# Patient Record
Sex: MALE | Race: OTHER | NOT HISPANIC OR LATINO | URBAN - METROPOLITAN AREA
[De-identification: names, ages, dates, MRNs, and addresses within clinical notes are randomized per-mention and may not be internally consistent; named-entity substitution may affect disease eponyms.]

---

## 2020-04-02 ENCOUNTER — EMERGENCY (EMERGENCY)
Facility: HOSPITAL | Age: 30
LOS: 1 days | Discharge: HOME | End: 2020-04-02
Admitting: EMERGENCY MEDICINE
Payer: MEDICAID

## 2020-04-02 VITALS — OXYGEN SATURATION: 98 % | RESPIRATION RATE: 18 BRPM | HEART RATE: 99 BPM

## 2020-04-02 VITALS
RESPIRATION RATE: 20 BRPM | TEMPERATURE: 98 F | DIASTOLIC BLOOD PRESSURE: 68 MMHG | OXYGEN SATURATION: 100 % | HEART RATE: 111 BPM | SYSTOLIC BLOOD PRESSURE: 117 MMHG

## 2020-04-02 PROCEDURE — 99283 EMERGENCY DEPT VISIT LOW MDM: CPT

## 2020-04-02 RX ORDER — ACETAMINOPHEN 500 MG
650 TABLET ORAL ONCE
Refills: 0 | Status: COMPLETED | OUTPATIENT
Start: 2020-04-02 | End: 2020-04-02

## 2020-04-02 RX ADMIN — Medication 650 MILLIGRAM(S): at 19:58

## 2020-04-02 NOTE — ED PROVIDER NOTE - PLAN OF CARE
Likely URI, limited concern for bacterial infection at this time.   Vital signs reviewed, HR mildly elevated with fever. Will provide PO tylenol, PO fluids and re-assess. Walking O2 sat trial. Imaging would not be beneficial at this time. No other acute intervention indicated.

## 2020-04-02 NOTE — ED PROVIDER NOTE - NSFOLLOWUPINSTRUCTIONS_ED_ALL_ED_FT
Your symptoms may be due to the novel coronavirus (COVID19).     For any questions regarding COVID19 or quarantine, changes in your clinical status or any concerns, please call: [facility] Emergency [facility] at: [phone]    Please return to the ER if you develop worsening or concerning symptoms, shortness of breath, high fevers not improved with antifever medications, difficulty eating or drinking, chest pain, or anything else of concern to you.    You may take 500-1000 mg acetaminophen every 6 hours, as needed for fever / pain  You may take 600 mg ibuprofen every 8 hours, with food, as needed for fever / pain    What is a coronavirus?  Coronaviruses are a large family of viruses that cause illnesses ranging from the common cold to more severe diseases such as Middle East Respiratory Syndrome (MERS) and Severe Acute Respiratory Syndrome (SARS).    What is Novel Coronavirus (COVID-19)?  The Centers for Disease Control and Prevention (CDC) is closely monitoring the outbreak caused by COVID-19. For the latest information about COVID-19, visit the CDC website at CDC.gov/Coronavirus    How are coronaviruses spread?  Coronaviruses can be transmitted from person-toperson, usually after close contact with an infected  person (for example, in a household, workplace, or healthcare setting), via droplets that become airborne after a cough or sneeze. These droplets can then infect a nearby person. Transmission can also occur by touching recently contaminated surfaces.    Is there a treatment for a COVID-19?  There is no specific treatment for disease caused by COVID-19. However, many of the symptoms can be treated based on the patient’s clinical condition. Supportive care for infected persons can be highly effective.    What are the symptoms of coronavirus infection?  It depends on the virus, but common signs include fever and/or respiratory symptoms such as cough and shortness of breath. In more severe cases, infection can cause pneumonia, severe acute respiratory syndrome, kidney failure and even death. Fortunately, most cases of COVID-19 have an illness no different than the influenza (flu), with a majority of these patients having mild symptoms and overall mortality which appears to be not much different than the flu.    What can I do to protect myself?  The best precautionary measures:  – washing your hands  – covering your cough  – disinfecting surfaces  – it is also advisable to avoid close contact with anyone showing symptoms of respiratory illness such as coughing and sneezing  – those with symptoms should wear a surgical mask when around others    What can I do to protect those around me?  If you have been identified as someone who may be infected with COVID-19, we recommend you follow the self-isolation procedures outlined on the following page to protect those around you and to limit the spread of this virus.    We recommend the below precautionary steps from now until 14 days from when you returned from your travel or date of your last known possible contact:    — Do not go to work, school or public areas. Avoid using public transportation, ridesharing or taxis.  — As much as possible, separate yourself from other people in your home. If you can, you should stay in a room and away from other people. Also, you should use a separate bathroom if available.  — Wear the supplied mask whenever you are around other people.  — If you have a non-urgent medical appointment, please reschedule for a later date. If the appointment is urgent, please call the health care provider and tell them that you are on self-isolation for possible COVID-19. This will help the health care provider’s office take steps to keep other people from getting infected or exposed. If you can reschedule routine appointments, do so.  — Wash your hands often with soap and water for at least 15 to 20 seconds or clean your hands with an alcohol-based hand  that contains 60 to 95% alcohol, covering all surfaces of your hands and rubbing them together until they feel dry. Soap and water should be used preferentially if hands are visibly dirty.  — Cover your mouth and nose with a tissue when you cough or sneeze. Throw used tissues in a lined trash can. Immediately wash your hands.  — Avoid touching your eyes, nose, and mouth with your hands.  — Avoid sharing personal household items. You should not share dishes, drinking glasses, cups, eating utensils, towels, or bedding with other people or pets in your home. After using these items, they should be washed thoroughly with soap and water.  — Clean and disinfect all “high-touch”surfaces every day. High touch surfaces include counters, tabletops, doorknobs, light switches, remote controls, bathroom fixtures, toilets, phones, keyboards, tablets, and bedside tables. Also, clean any surfaces that may have blood, stool, or body fluids on them.

## 2020-04-02 NOTE — ED PROVIDER NOTE - RESPIRATORY, MLM
Breath sounds clear and equal bilaterally. Non labored. No accessory muscle use. No intercostal muscle retractions. Speaking full sentences.

## 2020-04-02 NOTE — ED PROVIDER NOTE - PATIENT PORTAL LINK FT
You can access the FollowMyHealth Patient Portal offered by [facility] by registering at the following website: [email or web address]. By joining [employer]’s FollowMyHealth portal, you will also be able to view your health information using other applications (apps) compatible with our system.

## 2020-04-02 NOTE — ED PROVIDER NOTE - OBJECTIVE STATEMENT
This pt is a 28 y/o M w/ no significant PMHx presenting to ED today c/o fever, cough x3-4 days. Tmax 100.5F, last taken tylenol at 3PM today. Reports mild sob 2/2 cough, none at rest. Also c/o chills. Wife at home with similar symptoms otherwise no other sick contacts. Non smoker. Denies cp, dyspnea on exertion, abd pain, N/V/D, myalgias, dizziness, lightheadedness, occupational exposures, recent travel, hx asthma/copd. No other acute complaints or concerns this visit. This pt is a 28 y/o M w/ no significant PMHx presenting to ED today c/o fever, cough x3-4 days. Tmax 100.5F, last taken tylenol at 3PM today. Reports mild sob 2/2 cough, none at rest, none at present. Also c/o chills. Intermittent dry cough. Wife at home with similar symptoms otherwise no other sick contacts. Non smoker. Denies cp, dyspnea on exertion, abd pain, N/V/D, myalgias, dizziness, lightheadedness, occupational exposures, recent travel, hx asthma/copd. No other acute complaints or concerns this visit.

## 2020-04-02 NOTE — ED PROVIDER NOTE - CARE PLAN
Principal Discharge DX:	URI (upper respiratory infection)  Assessment and plan of treatment:	Likely URI, limited concern for bacterial infection at this time.   Vital signs reviewed, HR mildly elevated with fever. Will provide PO tylenol, PO fluids and re-assess. Walking O2 sat trial. Imaging would not be beneficial at this time. No other acute intervention indicated.

## 2020-04-02 NOTE — ED PROVIDER NOTE - CLINICAL SUMMARY MEDICAL DECISION MAKING FREE TEXT BOX
28 y/o health M w/ no PMHx p/f cough, sob, fever, chills. No symptoms at present at rest. Pt otherwise well appearing with benign examination without concerning signs or features. Imaging not indicated at this time as likely URI, cannot rule out covid. Tylenol provided with resolution of fever and tachycardia. Walking O2 sat trial without hypoxia noted or change in breathing effort. Will discharge home with strict return instructions.

## 2020-04-03 ENCOUNTER — EMERGENCY (EMERGENCY)
Facility: HOSPITAL | Age: 30
LOS: 1 days | Discharge: HOME | End: 2020-04-03
Admitting: EMERGENCY MEDICINE
Payer: MEDICAID

## 2020-04-03 VITALS
SYSTOLIC BLOOD PRESSURE: 110 MMHG | OXYGEN SATURATION: 97 % | TEMPERATURE: 100 F | RESPIRATION RATE: 22 BRPM | HEART RATE: 112 BPM | DIASTOLIC BLOOD PRESSURE: 68 MMHG

## 2020-04-03 VITALS
SYSTOLIC BLOOD PRESSURE: 106 MMHG | RESPIRATION RATE: 20 BRPM | OXYGEN SATURATION: 100 % | TEMPERATURE: 100 F | HEART RATE: 100 BPM | DIASTOLIC BLOOD PRESSURE: 68 MMHG

## 2020-04-03 PROCEDURE — 71045 X-RAY EXAM CHEST 1 VIEW: CPT | Mod: 26

## 2020-04-03 PROCEDURE — 99283 EMERGENCY DEPT VISIT LOW MDM: CPT

## 2020-04-03 RX ORDER — ACETAMINOPHEN 500 MG
975 TABLET ORAL ONCE
Refills: 0 | Status: COMPLETED | OUTPATIENT
Start: 2020-04-03 | End: 2020-04-03

## 2020-04-03 RX ADMIN — Medication 975 MILLIGRAM(S): at 10:53

## 2020-04-03 NOTE — ED PROVIDER NOTE - PATIENT PORTAL LINK FT
You can access the FollowMyHealth Patient Portal offered by [facility] by registering at the following website: [email or web address]. By joining [employer]’s FollowMyHealth portal, you will also be able to view your health information using other applications (apps) compatible with our system.

## 2020-04-03 NOTE — ED PROVIDER NOTE - PHYSICAL EXAMINATION
no nuchal rigidity. Tachypnea, febrile, able to ambulate over 20 feet, O2 saturation with ambulation 97% with . Lips and finger tips pink, wwp, cap refill <2 secs. Arti Shanks NP

## 2020-04-03 NOTE — ED PROVIDER NOTE - CLINICAL SUMMARY MEDICAL DECISION MAKING FREE TEXT BOX
1052: 30 y/o M with worsening cough and SOB on exertion.  Denies CP, walking saturation 97%.  Tachycardic to 110's, also febrile. Will give tylenol, cxr, reassess. Arti Shanks NP 1052: 30 y/o M with worsening cough and SOB on exertion.  Denies CP, walking saturation 97%.  Tachycardic to 110's, also febrile. Will give tylenol, cxr, reassess. Arti Shanks NP    28y/o M with no significant medical history c/o increase difficulty when breathing deeply, fever, chills, body aches, weakness and stomach pain .  Pt reports cough for the past week that has increased today. Pt has not been in contact with anyone with COVID-19. Hydrate appropriately, Tylenol PRN, Self quarantine. 1052: 28 y/o M with worsening cough and SOB on exertion.  Denies CP, walking saturation 97%.  Tachycardic to 110's, also febrile. Will give tylenol, cxr, reassess. Arti Shanks NP    28y/o M with no significant medical history c/o increase difficulty when breathing deeply, fever, chills, body aches, weakness and stomach pain .  Pt reports cough for the past week that has increased today. Pt has not been in contact with anyone with COVID-19. PE or SBI less likely, more likely viral syndrome.  Hydrate appropriately, Tylenol PRN, Self quarantine. 1052: 30 y/o M with worsening cough and SOB on exertion.  Denies CP, walking saturation 97%.  Tachycardic to 110's, also febrile. Will give tylenol, cxr, reassess. Arti Shanks NP    28y/o M with no significant medical history c/o increase difficulty when breathing deeply, fever, chills, body aches, weakness and stomach pain .  Pt reports cough for the past week that has increased today. Pt has not been in contact with anyone with COVID-19. PE or SBI less likely, pt is well appearing, denies CP, HA but no nuchal rigidity, neck supple, more likely viral syndrome.  Hydrate appropriately, Tylenol PRN, Self quarantine.

## 2020-04-03 NOTE — ED PROVIDER NOTE - OBJECTIVE STATEMENT
30 y/o M w/ no significant PMHx presenting to ED today c/o fever, cough 4-5 days. Tmax 102.8F, last taken tylenol at 6aM today. Reports worsening SOB, cough.  Also c/o chills. Intermittent dry cough. Wife at home with similar symptoms otherwise no other sick contacts. Non smoker. Denies cp, abd pain, N/V/D, myalgias, dizziness, lightheadedness, occupational exposures, recent travel, hx asthma/copd. No other acute complaints or concerns this visit. Seen yesterday and d/c'ed home with return precautions, reports worsening sob today which brought him back. 28 y/o M w/ no significant PMHx presenting to ED today c/o fever, cough 4-5 days. Tmax 102.8F, last taken tylenol at 6aM today. Reports worsening SOB, cough.  Also c/o chills. Intermittent dry cough. Wife at home with similar symptoms otherwise no other sick contacts. Non smoker. Denies cp, abd pain, N/V/D, myalgias, dizziness, lightheadedness, occupational exposures, recent travel, hx asthma/copd. No other acute complaints or concerns this visit. Seen yesterday and d/c'ed home with return precautions, reports worsening sob today which brought him back. He is on day 3 of azithromycin prescribed by his PCP.

## 2020-04-03 NOTE — ED ADULT TRIAGE NOTE - CHIEF COMPLAINT QUOTE
pt recently discharged yesterday for flu like symptoms. pt noted with fevers, cough and JOHNSON. pt feels worse today compared to yesterday.

## 2020-04-03 NOTE — ED PROVIDER NOTE - NSFOLLOWUPINSTRUCTIONS_ED_ALL_ED_FT
Take 2 tablets of Tylenol/Acetaminophen every 6-8 hours as needed for pain/comfort  Self quarantine  Return for worsening symptoms.     Fever    A fever is an increase in the body's temperature above 100.4°F (38°C) or higher. In adults and children older than three months, a brief mild or moderate fever generally has no long-term effect, and it usually does not require treatment. Many times, fevers are the result of viral infections, which are self-resolving.  However, certain symptoms or diagnostic tests may suggest a bacterial infection that may respond to antibiotics. Take medications as directed by your health care provider.    SEEK IMMEDIATE MEDICAL CARE IF YOU OR YOUR CHILD HAVE ANY OF THE FOLLOWING SYMPTOMS : shortness of breath, seizure, rash/stiff neck/headache, severe abdominal pain, persistent vomiting, any signs of dehydration.     -- Please avoid contact with others while you are symptomatic and ideally two weeks.  -- This is likely a virus.  There is no direct treatment for a virus, and antibiotics are not effective.     -- Rest, increase your fluid intake and avoid dehydration.  -- It is very important to be diligent about hand washing & hygiene to avoid spreading the illness to others.  -- If you are having any worsening shortness of breath, please see your doctor or return to the Emergency Department.  -- Please continue taking your home medications as directed.     Why didn’t I get tested for novel coronavirus (COVID-19)?    The number of available tests is very limited so strict rules exist for who is allowed to be tested. [facility] has been authorized to perform testing and is currently working hard to be able to start providing the test. Such testing is currently reserved for patients who have had contact with someone infected with the virus, or those who are very sick a plus those who have traveled to areas identified by the Centers for Disease Control and Prevention (CD) and will require hospitalization.     What should I do now?    If you are well enough to be discharged home and are not in a high risk group to have contracted the COVID-19, you should care for yourself at home exactly like you would if you have Influenza “flu”. Follow all the standard guidelines about washing your hands, covering your cough, etc. You should return to the Emergency Department if you develop worse symptoms, trouble breathing, chest pain, and/or a fever that doesn’t improve with over the counter acetaminophen or ibuprofen. Take 2 tablets of Tylenol/Acetaminophen every 6-8 hours as needed for pain/comfort  Drink fluids with water, salt and sugar like diluted apple juice, gatorade, poweraide or other rehydration drinks.   Self quarantine until fever and symptoms free for 3 days without medicine.   Return for worsening symptoms.     Fever    A fever is an increase in the body's temperature above 100.4°F (38°C) or higher. In adults and children older than three months, a brief mild or moderate fever generally has no long-term effect, and it usually does not require treatment. Many times, fevers are the result of viral infections, which are self-resolving.  However, certain symptoms or diagnostic tests may suggest a bacterial infection that may respond to antibiotics. Take medications as directed by your health care provider.    SEEK IMMEDIATE MEDICAL CARE IF YOU OR YOUR CHILD HAVE ANY OF THE FOLLOWING SYMPTOMS : shortness of breath, seizure, rash/stiff neck/headache, severe abdominal pain, persistent vomiting, any signs of dehydration.     -- Please avoid contact with others while you are symptomatic and ideally two weeks.  -- This is likely a virus.  There is no direct treatment for a virus, and antibiotics are not effective.     -- Rest, increase your fluid intake and avoid dehydration.  -- It is very important to be diligent about hand washing & hygiene to avoid spreading the illness to others.  -- If you are having any worsening shortness of breath, please see your doctor or return to the Emergency Department.  -- Please continue taking your home medications as directed.     Why didn’t I get tested for novel coronavirus (COVID-19)?    The number of available tests is very limited so strict rules exist for who is allowed to be tested. [facility] has been authorized to perform testing and is currently working hard to be able to start providing the test. Such testing is currently reserved for patients who have had contact with someone infected with the virus, or those who are very sick a plus those who have traveled to areas identified by the Centers for Disease Control and Prevention (CD) and will require hospitalization.     What should I do now?    If you are well enough to be discharged home and are not in a high risk group to have contracted the COVID-19, you should care for yourself at home exactly like you would if you have Influenza “flu”. Follow all the standard guidelines about washing your hands, covering your cough, etc. You should return to the Emergency Department if you develop worse symptoms, trouble breathing, chest pain, and/or a fever that doesn’t improve with over the counter acetaminophen or ibuprofen.